# Patient Record
Sex: MALE | URBAN - METROPOLITAN AREA
[De-identification: names, ages, dates, MRNs, and addresses within clinical notes are randomized per-mention and may not be internally consistent; named-entity substitution may affect disease eponyms.]

---

## 2020-02-03 ENCOUNTER — APPOINTMENT (OUTPATIENT)
Dept: URBAN - METROPOLITAN AREA CLINIC 211 | Age: 60
Setting detail: DERMATOLOGY
End: 2020-02-03

## 2020-02-03 DIAGNOSIS — D485 NEOPLASM OF UNCERTAIN BEHAVIOR OF SKIN: ICD-10-CM

## 2020-02-03 DIAGNOSIS — L81.4 OTHER MELANIN HYPERPIGMENTATION: ICD-10-CM

## 2020-02-03 DIAGNOSIS — L82.1 OTHER SEBORRHEIC KERATOSIS: ICD-10-CM

## 2020-02-03 DIAGNOSIS — D18.0 HEMANGIOMA: ICD-10-CM

## 2020-02-03 DIAGNOSIS — Z71.89 OTHER SPECIFIED COUNSELING: ICD-10-CM

## 2020-02-03 DIAGNOSIS — D22 MELANOCYTIC NEVI: ICD-10-CM

## 2020-02-03 DIAGNOSIS — D69.2 OTHER NONTHROMBOCYTOPENIC PURPURA: ICD-10-CM

## 2020-02-03 PROBLEM — D22.5 MELANOCYTIC NEVI OF TRUNK: Status: ACTIVE | Noted: 2020-02-03

## 2020-02-03 PROBLEM — D18.01 HEMANGIOMA OF SKIN AND SUBCUTANEOUS TISSUE: Status: ACTIVE | Noted: 2020-02-03

## 2020-02-03 PROBLEM — D48.5 NEOPLASM OF UNCERTAIN BEHAVIOR OF SKIN: Status: ACTIVE | Noted: 2020-02-03

## 2020-02-03 PROCEDURE — OTHER REASSURANCE: OTHER

## 2020-02-03 PROCEDURE — OTHER COUNSELING: OTHER

## 2020-02-03 PROCEDURE — OTHER BIOPSY BY SHAVE METHOD: OTHER

## 2020-02-03 PROCEDURE — 11102 TANGNTL BX SKIN SINGLE LES: CPT

## 2020-02-03 PROCEDURE — OTHER SUNSCREEN RECOMMENDATIONS: OTHER

## 2020-02-03 PROCEDURE — 99203 OFFICE O/P NEW LOW 30 MIN: CPT | Mod: 25

## 2020-02-03 PROCEDURE — OTHER MIPS QUALITY: OTHER

## 2020-02-03 ASSESSMENT — LOCATION ZONE DERM
LOCATION ZONE: TRUNK
LOCATION ZONE: TOE
LOCATION ZONE: ARM

## 2020-02-03 ASSESSMENT — LOCATION SIMPLE DESCRIPTION DERM
LOCATION SIMPLE: PLANTAR SURFACE OF RIGHT 1ST TOE
LOCATION SIMPLE: RIGHT UPPER BACK
LOCATION SIMPLE: CHEST
LOCATION SIMPLE: LEFT SHOULDER

## 2020-02-03 ASSESSMENT — LOCATION DETAILED DESCRIPTION DERM
LOCATION DETAILED: RIGHT LATERAL PLANTAR 1ST TOE
LOCATION DETAILED: RIGHT SUPERIOR UPPER BACK
LOCATION DETAILED: RIGHT MID-UPPER BACK
LOCATION DETAILED: LEFT ANTERIOR SHOULDER
LOCATION DETAILED: LEFT LATERAL SUPERIOR CHEST
LOCATION DETAILED: LEFT MEDIAL SUPERIOR CHEST

## 2020-02-03 NOTE — PROCEDURE: BIOPSY BY SHAVE METHOD
Biopsy Method: Personna blade
Notification Instructions: Patient will be notified of biopsy results. However, patient instructed to call the office if not contacted within 2 weeks.
Wound Care: Petrolatum
Hide Topical Anesthesia?: No
Electrodesiccation And Curettage Text: The wound bed was treated with electrodesiccation and curettage after the biopsy was performed.
Depth Of Biopsy: dermis
Anesthesia Type: 1% lidocaine with epinephrine
Consent: Written consent was obtained and risks were reviewed including but not limited to scarring, infection, bleeding, scabbing, incomplete removal, nerve damage and allergy to anesthesia.
Curettage Text: The wound bed was treated with curettage after the biopsy was performed.
Biopsy Type: H and E
Billing Type: Third-Party Bill
Type Of Destruction Used: Curettage
Additional Anesthesia Volume In Cc (Will Not Render If 0): 0
Was A Bandage Applied: Yes
Post-Care Instructions: I reviewed with the patient in detail post-care instructions. Patient is to keep the biopsy site dry overnight, and then apply bacitracin twice daily until healed. Patient may apply hydrogen peroxide soaks to remove any crusting.
Dressing: bandage
Electrodesiccation Text: The wound bed was treated with electrodesiccation after the biopsy was performed.
Anesthesia Volume In Cc (Will Not Render If 0): 0.5
Cryotherapy Text: The wound bed was treated with cryotherapy after the biopsy was performed.
Hemostasis: Drysol
Detail Level: Detailed
Size Of Lesion In Cm: 0.2
Silver Nitrate Text: The wound bed was treated with silver nitrate after the biopsy was performed.

## 2020-02-03 NOTE — PROCEDURE: MIPS QUALITY
Quality 110: Preventive Care And Screening: Influenza Immunization: Influenza immunization was not ordered or administered, reason not given
Quality 474: Zoster Vaccination Status: Shingrix Vaccination Administered or Previously Received
Detail Level: Detailed
Quality 400a: One-Time Screening For Hepatitis C Virus (Hcv) For All Patients: Patient received one-time screening for HCV infection
Quality 226: Preventive Care And Screening: Tobacco Use: Screening And Cessation Intervention: Patient screened for tobacco use and is an ex/non-smoker
Quality 130: Documentation Of Current Medications In The Medical Record: Current Medications Documented
Quality 431: Preventive Care And Screening: Unhealthy Alcohol Use - Screening: Patient screened for unhealthy alcohol use using a single question and scores less than 2 times per year
Quality 131: Pain Assessment And Follow-Up: Pain assessment using a standardized tool is documented as negative, no follow-up plan required
Quality 265: Biopsy Follow-Up: Biopsy results reviewed, communicated, tracked, and documented

## 2021-02-10 ENCOUNTER — APPOINTMENT (OUTPATIENT)
Dept: URBAN - METROPOLITAN AREA CLINIC 211 | Age: 61
Setting detail: DERMATOLOGY
End: 2021-02-11

## 2021-02-10 DIAGNOSIS — Z71.89 OTHER SPECIFIED COUNSELING: ICD-10-CM

## 2021-02-10 DIAGNOSIS — L81.4 OTHER MELANIN HYPERPIGMENTATION: ICD-10-CM

## 2021-02-10 DIAGNOSIS — D485 NEOPLASM OF UNCERTAIN BEHAVIOR OF SKIN: ICD-10-CM

## 2021-02-10 DIAGNOSIS — D22 MELANOCYTIC NEVI: ICD-10-CM

## 2021-02-10 DIAGNOSIS — L82.1 OTHER SEBORRHEIC KERATOSIS: ICD-10-CM

## 2021-02-10 DIAGNOSIS — D18.0 HEMANGIOMA: ICD-10-CM

## 2021-02-10 DIAGNOSIS — L72.8 OTHER FOLLICULAR CYSTS OF THE SKIN AND SUBCUTANEOUS TISSUE: ICD-10-CM

## 2021-02-10 PROBLEM — D48.5 NEOPLASM OF UNCERTAIN BEHAVIOR OF SKIN: Status: ACTIVE | Noted: 2021-02-10

## 2021-02-10 PROBLEM — D22.62 MELANOCYTIC NEVI OF LEFT UPPER LIMB, INCLUDING SHOULDER: Status: ACTIVE | Noted: 2021-02-10

## 2021-02-10 PROBLEM — D22.5 MELANOCYTIC NEVI OF TRUNK: Status: ACTIVE | Noted: 2021-02-10

## 2021-02-10 PROBLEM — D18.01 HEMANGIOMA OF SKIN AND SUBCUTANEOUS TISSUE: Status: ACTIVE | Noted: 2021-02-10

## 2021-02-10 PROCEDURE — OTHER REASSURANCE: OTHER

## 2021-02-10 PROCEDURE — OTHER BIOPSY BY SHAVE METHOD: OTHER

## 2021-02-10 PROCEDURE — 99213 OFFICE O/P EST LOW 20 MIN: CPT | Mod: 25

## 2021-02-10 PROCEDURE — OTHER MIPS QUALITY: OTHER

## 2021-02-10 PROCEDURE — OTHER COUNSELING: OTHER

## 2021-02-10 PROCEDURE — 11102 TANGNTL BX SKIN SINGLE LES: CPT

## 2021-02-10 ASSESSMENT — LOCATION SIMPLE DESCRIPTION DERM
LOCATION SIMPLE: RIGHT SHOULDER
LOCATION SIMPLE: LEFT SHOULDER
LOCATION SIMPLE: RIGHT UPPER BACK
LOCATION SIMPLE: RIGHT LOWER BACK
LOCATION SIMPLE: CHEST

## 2021-02-10 ASSESSMENT — LOCATION DETAILED DESCRIPTION DERM
LOCATION DETAILED: RIGHT POSTERIOR SHOULDER
LOCATION DETAILED: RIGHT SUPERIOR UPPER BACK
LOCATION DETAILED: RIGHT INFERIOR LATERAL MIDBACK
LOCATION DETAILED: LEFT MEDIAL SUPERIOR CHEST
LOCATION DETAILED: RIGHT MID-UPPER BACK
LOCATION DETAILED: LEFT ANTERIOR SHOULDER

## 2021-02-10 ASSESSMENT — LOCATION ZONE DERM
LOCATION ZONE: ARM
LOCATION ZONE: TRUNK

## 2021-02-10 NOTE — PROCEDURE: BIOPSY BY SHAVE METHOD
Depth Of Biopsy: dermis
Dressing: bandage
Information: Selecting Yes will display possible errors in your note based on the variables you have selected. This validation is only offered as a suggestion for you. PLEASE NOTE THAT THE VALIDATION TEXT WILL BE REMOVED WHEN YOU FINALIZE YOUR NOTE. IF YOU WANT TO FAX A PRELIMINARY NOTE YOU WILL NEED TO TOGGLE THIS TO 'NO' IF YOU DO NOT WANT IT IN YOUR FAXED NOTE.
Anesthesia Volume In Cc (Will Not Render If 0): 1
Detail Level: Detailed
Hide Topical Anesthesia?: No
Silver Nitrate Text: The wound bed was treated with silver nitrate after the biopsy was performed.
Billing Type: Third-Party Bill
Additional Anesthesia Volume In Cc (Will Not Render If 0): 0
Was A Bandage Applied: Yes
Electrodesiccation And Curettage Text: The wound bed was treated with electrodesiccation and curettage after the biopsy was performed.
Type Of Destruction Used: Curettage
Electrodesiccation Text: The wound bed was treated with electrodesiccation after the biopsy was performed.
Hemostasis: Drysol
Biopsy Type: H and E
Cryotherapy Text: The wound bed was treated with cryotherapy after the biopsy was performed.
Notification Instructions: Patient will be notified of biopsy results. However, patient instructed to call the office if not contacted within 2 weeks.
Consent: Written consent was obtained and risks were reviewed including but not limited to scarring, infection, bleeding, scabbing, incomplete removal, nerve damage and allergy to anesthesia.
Size Of Lesion In Cm: 0.9
Wound Care: Petrolatum
Biopsy Method: Personna blade
Anesthesia Type: 1% lidocaine with epinephrine
Curettage Text: The wound bed was treated with curettage after the biopsy was performed.
Post-Care Instructions: I reviewed with the patient in detail post-care instructions. Patient is to keep the biopsy site dry overnight, and then apply bacitracin twice daily until healed. Patient may apply hydrogen peroxide soaks to remove any crusting.

## 2021-02-10 NOTE — PROCEDURE: MIPS QUALITY
Detail Level: Detailed
Quality 265: Biopsy Follow-Up: Biopsy results reviewed, communicated, tracked, and documented
Quality 431: Preventive Care And Screening: Unhealthy Alcohol Use - Screening: Patient screened for unhealthy alcohol use using a single question and scores less than 2 times per year
Quality 131: Pain Assessment And Follow-Up: Pain assessment using a standardized tool is documented as negative, no follow-up plan required
Quality 130: Documentation Of Current Medications In The Medical Record: Current Medications Documented
Quality 474: Zoster Vaccination Status: Shingrix Vaccination Administered or Previously Received
Quality 110: Preventive Care And Screening: Influenza Immunization: Influenza immunization was not ordered or administered, reason not given
Quality 400a: One-Time Screening For Hepatitis C Virus (Hcv) For All Patients: Patient received one-time screening for HCV infection
Quality 226: Preventive Care And Screening: Tobacco Use: Screening And Cessation Intervention: Patient screened for tobacco use and is an ex/non-smoker

## 2022-02-16 ENCOUNTER — APPOINTMENT (OUTPATIENT)
Dept: URBAN - METROPOLITAN AREA CLINIC 211 | Age: 62
Setting detail: DERMATOLOGY
End: 2022-02-17

## 2022-02-16 DIAGNOSIS — L81.4 OTHER MELANIN HYPERPIGMENTATION: ICD-10-CM

## 2022-02-16 DIAGNOSIS — D18.0 HEMANGIOMA: ICD-10-CM

## 2022-02-16 DIAGNOSIS — D69.2 OTHER NONTHROMBOCYTOPENIC PURPURA: ICD-10-CM

## 2022-02-16 DIAGNOSIS — Z87.2 PERSONAL HISTORY OF DISEASES OF THE SKIN AND SUBCUTANEOUS TISSUE: ICD-10-CM

## 2022-02-16 DIAGNOSIS — Z71.89 OTHER SPECIFIED COUNSELING: ICD-10-CM

## 2022-02-16 DIAGNOSIS — D22 MELANOCYTIC NEVI: ICD-10-CM

## 2022-02-16 DIAGNOSIS — L82.1 OTHER SEBORRHEIC KERATOSIS: ICD-10-CM

## 2022-02-16 DIAGNOSIS — L82.0 INFLAMED SEBORRHEIC KERATOSIS: ICD-10-CM

## 2022-02-16 PROBLEM — D22.62 MELANOCYTIC NEVI OF LEFT UPPER LIMB, INCLUDING SHOULDER: Status: ACTIVE | Noted: 2022-02-16

## 2022-02-16 PROBLEM — D18.01 HEMANGIOMA OF SKIN AND SUBCUTANEOUS TISSUE: Status: ACTIVE | Noted: 2022-02-16

## 2022-02-16 PROBLEM — D22.5 MELANOCYTIC NEVI OF TRUNK: Status: ACTIVE | Noted: 2022-02-16

## 2022-02-16 PROCEDURE — OTHER OBSERVATION: OTHER

## 2022-02-16 PROCEDURE — OTHER COUNSELING: OTHER

## 2022-02-16 PROCEDURE — OTHER LIQUID NITROGEN: OTHER

## 2022-02-16 PROCEDURE — 17110 DESTRUCT B9 LESION 1-14: CPT

## 2022-02-16 PROCEDURE — OTHER REASSURANCE: OTHER

## 2022-02-16 PROCEDURE — 99213 OFFICE O/P EST LOW 20 MIN: CPT | Mod: 25

## 2022-02-16 PROCEDURE — OTHER MIPS QUALITY: OTHER

## 2022-02-16 ASSESSMENT — LOCATION DETAILED DESCRIPTION DERM
LOCATION DETAILED: LEFT SUPERIOR FRONTAL SCALP
LOCATION DETAILED: LEFT ANTERIOR SHOULDER
LOCATION DETAILED: RIGHT PROXIMAL DORSAL FOREARM
LOCATION DETAILED: RIGHT MID-UPPER BACK
LOCATION DETAILED: LEFT MEDIAL SUPERIOR CHEST
LOCATION DETAILED: RIGHT MEDIAL GREAT TOE
LOCATION DETAILED: RIGHT SUPERIOR UPPER BACK
LOCATION DETAILED: RIGHT INFERIOR LATERAL FOREHEAD
LOCATION DETAILED: RIGHT INFERIOR LATERAL MIDBACK
LOCATION DETAILED: RIGHT LATERAL EYEBROW
LOCATION DETAILED: LEFT LATERAL EYEBROW

## 2022-02-16 ASSESSMENT — LOCATION ZONE DERM
LOCATION ZONE: SCALP
LOCATION ZONE: FACE
LOCATION ZONE: ARM
LOCATION ZONE: TOE
LOCATION ZONE: TRUNK

## 2022-02-16 ASSESSMENT — LOCATION SIMPLE DESCRIPTION DERM
LOCATION SIMPLE: RIGHT UPPER BACK
LOCATION SIMPLE: RIGHT FOREARM
LOCATION SIMPLE: LEFT EYEBROW
LOCATION SIMPLE: RIGHT GREAT TOE
LOCATION SIMPLE: RIGHT LOWER BACK
LOCATION SIMPLE: SCALP
LOCATION SIMPLE: LEFT SHOULDER
LOCATION SIMPLE: CHEST
LOCATION SIMPLE: RIGHT EYEBROW
LOCATION SIMPLE: RIGHT FOREHEAD

## 2022-02-16 NOTE — PROCEDURE: LIQUID NITROGEN
Show Aperture Variable?: Yes
Consent: The patient's consent was obtained including but not limited to risks of crusting, scabbing, blistering, scarring, darker or lighter pigmentary change, recurrence, incomplete removal and infection.
Medical Necessity Information: It is in your best interest to select a reason for this procedure from the list below. All of these items fulfill various CMS LCD requirements except the new and changing color options.
Spray Paint Text: The liquid nitrogen was applied to the skin utilizing a spray paint frosting technique.
Spray Paint Technique: No
Post-Care Instructions: I reviewed with the patient in detail post-care instructions. Patient is to wear sunprotection, and avoid picking at any of the treated lesions. Pt may apply Vaseline to crusted or scabbing areas.
Pared With?: 15 blade
Detail Level: Simple
Medical Necessity Clause: This procedure was medically necessary because the lesions that were treated were:

## 2022-02-16 NOTE — PROCEDURE: MIPS QUALITY
Quality 431: Preventive Care And Screening: Unhealthy Alcohol Use - Screening: Patient screened for unhealthy alcohol use using a single question and scores less than 2 times per year
Quality 474: Zoster Vaccination Status: Shingrix Vaccination Administered or Previously Received
Quality 226: Preventive Care And Screening: Tobacco Use: Screening And Cessation Intervention: Patient screened for tobacco use and is an ex/non-smoker
Quality 110: Preventive Care And Screening: Influenza Immunization: Influenza immunization was not ordered or administered, reason not given
Quality 400a: One-Time Screening For Hepatitis C Virus (Hcv) For All Patients: Patient received one-time screening for HCV infection
Quality 130: Documentation Of Current Medications In The Medical Record: Current Medications Documented
Quality 131: Pain Assessment And Follow-Up: Pain assessment using a standardized tool is documented as negative, no follow-up plan required
Detail Level: Detailed

## 2022-02-17 ENCOUNTER — APPOINTMENT (OUTPATIENT)
Dept: URBAN - METROPOLITAN AREA CLINIC 211 | Age: 62
Setting detail: DERMATOLOGY
End: 2022-02-22

## 2022-02-17 DIAGNOSIS — Z41.9 ENCOUNTER FOR PROCEDURE FOR PURPOSES OTHER THAN REMEDYING HEALTH STATE, UNSPECIFIED: ICD-10-CM

## 2022-02-17 PROCEDURE — OTHER MIPS QUALITY: OTHER

## 2022-02-17 PROCEDURE — OTHER OTHER (COSMETIC): OTHER

## 2022-02-17 NOTE — PROCEDURE: MIPS QUALITY
Quality 130: Documentation Of Current Medications In The Medical Record: Current Medications Documented
Quality 131: Pain Assessment And Follow-Up: Pain assessment using a standardized tool is documented as negative, no follow-up plan required
Quality 431: Preventive Care And Screening: Unhealthy Alcohol Use - Screening: Patient screened for unhealthy alcohol use using a single question and scores less than 2 times per year
Detail Level: Detailed
Quality 474: Zoster Vaccination Status: Shingrix Vaccination Administered or Previously Received
Quality 110: Preventive Care And Screening: Influenza Immunization: Influenza immunization was not ordered or administered, reason not given
Quality 226: Preventive Care And Screening: Tobacco Use: Screening And Cessation Intervention: Patient screened for tobacco use and is an ex/non-smoker
Quality 400a: One-Time Screening For Hepatitis C Virus (Hcv) For All Patients: Patient received one-time screening for HCV infection

## 2022-02-17 NOTE — HPI: OTHER
Condition:: Cosmetic
Please Describe Your Condition:: Pt is interested in treatment options for vessels that he has ion his cheeks and also some browns that he has on his face.  Pt recently same Elke and had several of the darker Al’s/skis frozen on 2/16/22.  Medications, allergies and medical hx were reviewed.

## 2022-02-17 NOTE — PROCEDURE: OTHER (COSMETIC)
Detail Level: Zone
Other (Free Text): Cosmetic Consultation\\nPE:telangiectasias across mid cheeks and some larger and reticular vein on the nasal ala and grooves, some lighter sls on the lateral face, excess fat pad on lower lid and hooded upper lids.\\n\\nPLAN:\\n\\n1.  Rec IPL to partial face.  Discussed that the vessels may need more that one treatment especially around the nose.  Rationale was provided.  Discussed that since we were treating the reds and the sls fell mostly in the same area we could try and get results with the IPL.  Discussed that some of the sls were so light that they may not respond.  In particular pt had one on his left upper lid that I stated may not be able to be addressed due to its proximity on the lid and also how close it was to his eyebrows.  Also on the left lower lid pt has what appears to be an sl/sk, discussed that that may not be able to be addressed as well unless we could get the lesion on the occipital orbit.  The procedure was reviewed in detail, r&b and post treatment expectations.  Informational handout was given with a CQ.\\n\\n2.  Discussed with pt to address his concerns about his eyes he would likely need surgery to correct.  Provided pt with contact information for Dr. Virgen Salas.  Pt will consider a consultation.\\n\\nNOTE:\\n\\Valeriano indications, treatment expectations (including management of any possible irritations), protocols, risks and benefits, pre/post care are reviewed.  Patient understands that multiple treatments may be necessary for optimum results and that on going maintenance with at-home products and additional office visits or treatments may be needed to enhance and extend the desired results.\\n\\Valeriano questions were addressed.\\n\\nRTC-TBD

## 2022-02-23 ENCOUNTER — APPOINTMENT (OUTPATIENT)
Dept: URBAN - METROPOLITAN AREA CLINIC 211 | Age: 62
Setting detail: DERMATOLOGY
End: 2022-02-23

## 2022-02-23 DIAGNOSIS — Z41.9 ENCOUNTER FOR PROCEDURE FOR PURPOSES OTHER THAN REMEDYING HEALTH STATE, UNSPECIFIED: ICD-10-CM

## 2022-02-23 PROCEDURE — OTHER MIPS QUALITY: OTHER

## 2022-02-23 PROCEDURE — OTHER OTHER (COSMETIC): OTHER

## 2022-02-23 NOTE — HPI: OTHER
Condition:: Cosmetic
Please Describe Your Condition:: Pt presents for IPL to the nose and the cheeks to address vessels and some lighter sls.  Medications, allergies and medical hx were reviewed.

## 2022-02-23 NOTE — PROCEDURE: OTHER (COSMETIC)
Detail Level: Zone
Other (Free Text): IPL utilizing Max G\\n\\nPE:telangiectasias and sls/skis lighter in color on the upper cheeks\\n\\nIndication: improvement of pigmentation and reduction of vascular lesions\\n\\nPrior to treatment, all but not limited to treatment indications and expectations(including management of any possible irritations) protocols, risks and benefits were reviewed in detail, including post treatment expectations. All questions were answered prior to administering the treatment.\\n\\nTreatment #1\\n\\nSite(s):partial face\\n\\nPrior to the treatment, electrocautery was applied to select lesions on the face per protocol at 3 Mirza x several touches\\n\\nLUX LOTION:\\nLot #:[id number]\\nExp:11/22\\n\\nSettings:\\n\\nApplied   20 ms @  40 j increasing to 44 j x 1 pass\\nApplied 10 ms @ 34 j x 1 pass\\n\\nPre, meng and post cooling was applied utilizing the  and/or the Adelia Chiller. A moisturizing sunblock was applied post treatment. Patient tolerated the procedure well without immediate complication. \\n\\nNote:\\n\\n1.  Rec Botox for the crowns feet.  The procedure was reviewed in detail, r&b and post treatment expectations.  Reyna \\n\\nPatient understands that multiple treatments may be necessary for optimum results and that on going maintenance with at-home products and additional office visits or treatments may be needed to enhance and extend the desired results.\\n\\nStandard protocol was applied. The eyes were covered with IPL specific eye shields. Following treatment, the expected mild erythema and edema was observed. Post care was reviewed and a follow up appointment was recommended.\\n\\Valeriano questions were addressed.\\n\\nRTC:4 weeks IPL f/u

## 2022-02-23 NOTE — PROCEDURE: MIPS QUALITY
Detail Level: Detailed
Quality 474: Zoster Vaccination Status: Shingrix Vaccination Administered or Previously Received
Quality 110: Preventive Care And Screening: Influenza Immunization: Influenza immunization was not ordered or administered, reason not given
Quality 400a: One-Time Screening For Hepatitis C Virus (Hcv) For All Patients: Patient received one-time screening for HCV infection
Quality 226: Preventive Care And Screening: Tobacco Use: Screening And Cessation Intervention: Patient screened for tobacco use and is an ex/non-smoker
Quality 431: Preventive Care And Screening: Unhealthy Alcohol Use - Screening: Patient screened for unhealthy alcohol use using a single question and scores less than 2 times per year
Quality 130: Documentation Of Current Medications In The Medical Record: Current Medications Documented
Quality 131: Pain Assessment And Follow-Up: Pain assessment using a standardized tool is documented as negative, no follow-up plan required

## 2022-03-30 ENCOUNTER — APPOINTMENT (OUTPATIENT)
Dept: URBAN - METROPOLITAN AREA CLINIC 211 | Age: 62
Setting detail: DERMATOLOGY
End: 2022-03-30

## 2022-03-30 DIAGNOSIS — Z41.9 ENCOUNTER FOR PROCEDURE FOR PURPOSES OTHER THAN REMEDYING HEALTH STATE, UNSPECIFIED: ICD-10-CM

## 2022-03-30 PROCEDURE — OTHER MIPS QUALITY: OTHER

## 2022-03-30 PROCEDURE — OTHER OTHER (COSMETIC): OTHER

## 2022-03-30 NOTE — PROCEDURE: MIPS QUALITY
Quality 431: Preventive Care And Screening: Unhealthy Alcohol Use - Screening: Patient screened for unhealthy alcohol use using a single question and scores less than 2 times per year
Quality 226: Preventive Care And Screening: Tobacco Use: Screening And Cessation Intervention: Patient screened for tobacco use and is an ex/non-smoker
Quality 130: Documentation Of Current Medications In The Medical Record: Current Medications Documented
Quality 431: Preventive Care And Screening: Unhealthy Alcohol Use - Screening: Patient not identified as an unhealthy alcohol user when screened for unhealthy alcohol use using a systematic screening method
Quality 400a: One-Time Screening For Hepatitis C Virus (Hcv) For All Patients: Patient received one-time screening for HCV infection
Quality 110: Preventive Care And Screening: Influenza Immunization: Influenza Immunization previously received during influenza season
Detail Level: Detailed

## 2022-03-30 NOTE — PROCEDURE: OTHER (COSMETIC)
Other (Free Text): Cosmetic:\\nPlan: IPL Follow Up \\n\\nPE: \\n\\nPlan: \\n\\n1. \\n\\n2. \\n\\n3.\\n\\nNotes:\\n\\n\\Valeriano indications, treatment expectations (including management of any possible irritations}, protocols, risks and benefits, pre/post care was reviewed. Details of these can be found on the informed consent documentation. Patient understands that multiple treatments may be necessary for optimum results, and that ongoing maintenance with skin care products and additional office visit or treatments may be needed to enhance and extend desired results. \\n\\n\\nAnswered al questions. \\n\\nRTC:\\n
Detail Level: Zone

## 2022-03-30 NOTE — HPI: OTHER
Condition:: Cosmetic
Please Describe Your Condition:: is being seen for a chief complaint of Cosmetic. Patient presents today for IPL follow up from [facility] for facial vessels and browns. Patient states that he has seen a large overall improvement of browns and is pleased with treatment. He does note getting a small blister on the inferior nasal dorsum which has healed w/o comp. But feels he can still see a césar where the blister was.\\n\\n\\nMedications, allergies and med hx reviewed.

## 2023-02-22 ENCOUNTER — APPOINTMENT (OUTPATIENT)
Dept: URBAN - METROPOLITAN AREA CLINIC 211 | Age: 63
Setting detail: DERMATOLOGY
End: 2023-02-22

## 2023-02-22 DIAGNOSIS — Z87.2 PERSONAL HISTORY OF DISEASES OF THE SKIN AND SUBCUTANEOUS TISSUE: ICD-10-CM

## 2023-02-22 DIAGNOSIS — L82.0 INFLAMED SEBORRHEIC KERATOSIS: ICD-10-CM

## 2023-02-22 DIAGNOSIS — L82.1 OTHER SEBORRHEIC KERATOSIS: ICD-10-CM

## 2023-02-22 DIAGNOSIS — Z71.89 OTHER SPECIFIED COUNSELING: ICD-10-CM

## 2023-02-22 DIAGNOSIS — D18.0 HEMANGIOMA: ICD-10-CM

## 2023-02-22 DIAGNOSIS — L81.4 OTHER MELANIN HYPERPIGMENTATION: ICD-10-CM

## 2023-02-22 DIAGNOSIS — D22 MELANOCYTIC NEVI: ICD-10-CM

## 2023-02-22 PROBLEM — D22.62 MELANOCYTIC NEVI OF LEFT UPPER LIMB, INCLUDING SHOULDER: Status: ACTIVE | Noted: 2023-02-22

## 2023-02-22 PROBLEM — D22.5 MELANOCYTIC NEVI OF TRUNK: Status: ACTIVE | Noted: 2023-02-22

## 2023-02-22 PROBLEM — D18.01 HEMANGIOMA OF SKIN AND SUBCUTANEOUS TISSUE: Status: ACTIVE | Noted: 2023-02-22

## 2023-02-22 PROCEDURE — OTHER MIPS QUALITY: OTHER

## 2023-02-22 PROCEDURE — OTHER LIQUID NITROGEN (COSMETIC): OTHER

## 2023-02-22 PROCEDURE — OTHER COUNSELING: OTHER

## 2023-02-22 PROCEDURE — OTHER OBSERVATION: OTHER

## 2023-02-22 PROCEDURE — OTHER SUNSCREEN RECOMMENDATIONS: OTHER

## 2023-02-22 PROCEDURE — 99213 OFFICE O/P EST LOW 20 MIN: CPT

## 2023-02-22 PROCEDURE — OTHER REASSURANCE: OTHER

## 2023-02-22 ASSESSMENT — LOCATION ZONE DERM
LOCATION ZONE: NECK
LOCATION ZONE: TRUNK
LOCATION ZONE: ARM

## 2023-02-22 ASSESSMENT — LOCATION SIMPLE DESCRIPTION DERM
LOCATION SIMPLE: ABDOMEN
LOCATION SIMPLE: CHEST
LOCATION SIMPLE: LEFT FOREARM
LOCATION SIMPLE: POSTERIOR NECK
LOCATION SIMPLE: RIGHT LOWER BACK
LOCATION SIMPLE: LEFT LOWER BACK
LOCATION SIMPLE: RIGHT UPPER BACK

## 2023-02-22 ASSESSMENT — LOCATION DETAILED DESCRIPTION DERM
LOCATION DETAILED: LEFT DISTAL DORSAL FOREARM
LOCATION DETAILED: LEFT MEDIAL TRAPEZIAL NECK
LOCATION DETAILED: RIGHT RIB CAGE
LOCATION DETAILED: RIGHT INFERIOR UPPER BACK
LOCATION DETAILED: RIGHT INFERIOR LATERAL MIDBACK
LOCATION DETAILED: LEFT INFERIOR LATERAL MIDBACK
LOCATION DETAILED: LEFT MEDIAL INFERIOR CHEST

## 2023-02-22 NOTE — PROCEDURE: LIQUID NITROGEN (COSMETIC)
Billing Information: Bill by Static Price
Spray Paint Technique: No
Post-Care Instructions: I reviewed with the patient in detail post-care instructions. Patient is to wear sunprotection, and avoid picking at any of the treated lesions. Pt may apply Vaseline to crusted or scabbing areas.
Show Spray Paint Technique Variable?: Yes
Spray Paint Text: The liquid nitrogen was applied to the skin utilizing a spray paint frosting technique.
Consent: The patient's consent was obtained including but not limited to risks of crusting, scabbing, blistering, scarring, darker or lighter pigmentary change, recurrence, incomplete removal and infection. The patient understands that the procedure is cosmetic in nature and is not covered by insurance.
Detail Level: Detailed

## 2024-04-02 ENCOUNTER — APPOINTMENT (OUTPATIENT)
Dept: URBAN - METROPOLITAN AREA CLINIC 211 | Age: 64
Setting detail: DERMATOLOGY
End: 2024-04-02

## 2024-04-02 DIAGNOSIS — Z87.2 PERSONAL HISTORY OF DISEASES OF THE SKIN AND SUBCUTANEOUS TISSUE: ICD-10-CM

## 2024-04-02 DIAGNOSIS — D18.0 HEMANGIOMA: ICD-10-CM

## 2024-04-02 DIAGNOSIS — D22 MELANOCYTIC NEVI: ICD-10-CM

## 2024-04-02 DIAGNOSIS — Z12.83 ENCOUNTER FOR SCREENING FOR MALIGNANT NEOPLASM OF SKIN: ICD-10-CM

## 2024-04-02 DIAGNOSIS — Z71.89 OTHER SPECIFIED COUNSELING: ICD-10-CM

## 2024-04-02 DIAGNOSIS — L82.1 OTHER SEBORRHEIC KERATOSIS: ICD-10-CM

## 2024-04-02 DIAGNOSIS — L81.4 OTHER MELANIN HYPERPIGMENTATION: ICD-10-CM

## 2024-04-02 PROBLEM — D22.5 MELANOCYTIC NEVI OF TRUNK: Status: ACTIVE | Noted: 2024-04-02

## 2024-04-02 PROBLEM — D18.01 HEMANGIOMA OF SKIN AND SUBCUTANEOUS TISSUE: Status: ACTIVE | Noted: 2024-04-02

## 2024-04-02 PROBLEM — D22.62 MELANOCYTIC NEVI OF LEFT UPPER LIMB, INCLUDING SHOULDER: Status: ACTIVE | Noted: 2024-04-02

## 2024-04-02 PROCEDURE — OTHER COUNSELING: OTHER

## 2024-04-02 PROCEDURE — OTHER REASSURANCE: OTHER

## 2024-04-02 PROCEDURE — 99213 OFFICE O/P EST LOW 20 MIN: CPT

## 2024-04-02 PROCEDURE — OTHER OBSERVATION: OTHER

## 2024-04-02 PROCEDURE — OTHER SUNSCREEN RECOMMENDATIONS: OTHER

## 2024-04-02 PROCEDURE — OTHER MIPS QUALITY: OTHER

## 2024-04-02 ASSESSMENT — LOCATION SIMPLE DESCRIPTION DERM
LOCATION SIMPLE: CHEST
LOCATION SIMPLE: ABDOMEN
LOCATION SIMPLE: RIGHT LOWER BACK
LOCATION SIMPLE: LEFT FOREARM

## 2024-04-02 ASSESSMENT — LOCATION ZONE DERM
LOCATION ZONE: TRUNK
LOCATION ZONE: ARM

## 2024-04-02 ASSESSMENT — LOCATION DETAILED DESCRIPTION DERM
LOCATION DETAILED: RIGHT INFERIOR LATERAL MIDBACK
LOCATION DETAILED: LEFT MEDIAL INFERIOR CHEST
LOCATION DETAILED: EPIGASTRIC SKIN
LOCATION DETAILED: LEFT DISTAL DORSAL FOREARM